# Patient Record
(demographics unavailable — no encounter records)

---

## 2024-11-07 NOTE — HISTORY OF PRESENT ILLNESS
[FreeTextEntry1] : 69 yo female, hx of GERD  on pantoprazole for " years" with llq pain x 4 weeks. No weight loss, anorexia. Denies constipation. Never had colonoscopy. NO dysphagia. Denies CP or sob. NO family hx of colon cancer. Denies Fever or chills.NO dysuria.

## 2024-11-07 NOTE — ASSESSMENT
[FreeTextEntry1] : 69 yo female, hx of GERD  on pantoprazole for " years" with llq pain x 4 weeks. No weight loss, anorexia. Denies constipation. Never had colonoscopy. NO dysphagia. Denies CP or sob. NO family hx of colon cancer. Denies Fever or chills.NO dysuria.  IMP: 1. llq pain x 4 weeks- r/o diverticulitis, abscess 2. COlon cancer screening, average risk 3. Epigastric pain, gerd 4. Exclude Hpylori  PLAN: 1. ubt 2. ct abd/pelvis IV/oral at [facility] 3. She  was advised to undergo endoscopy to which she agreed. The procedure will be performed in [facility] with the assistance of an anesthesiologist. The procedure was explained in detail and she understood the risks of the procedure not limited  to infection, bleeding, perforation, risk of anesthesia and risk of IV site problems,emergency surgery, missed lesions  or non-diagnosis of stomach or esophageal  cancer.He/She]  was advised that she could not drive home alone, if the patient chooses to receive sedation. Further diagnostic and treatment recommendations will be based upon the procedure and any biopsies, if they are taken. 4. She was advised to undergo colonoscopy to which she  agreed. The procedure will be performed in [facility] with the assistance of an anesthesiologist. The procedure was explained in detail and she understood the risks of the procedure not limited  to infection, bleeding, perforation, risk of anesthesia and risk of IV site problems,emergency surgery, missed lesions  or non-diagnosis of colon cancer. She  was advised that she could not drive home alone, if the patient chooses to receive sedation. Further diagnostic and treatment recommendations will be based upon the procedure and any biopsies, if they are taken.

## 2024-11-07 NOTE — PHYSICAL EXAM

## 2025-04-03 NOTE — HISTORY OF PRESENT ILLNESS
[FreeTextEntry1] : Patient presents today with pain, sub. 2, left foot, which she states has been present for the past 3 weeks.  Patient has difficulty ambulating due to pain and discomfort.  She wears heels to [employer] on a daily basis and that may be aggravating her foot.

## 2025-04-03 NOTE — PHYSICAL EXAM
[2+] : left foot dorsalis pedis 2+ [No Joint Swelling] : no joint swelling [Normal Foot/Ankle] : Both lower extremities were exposed and visualized. Standing exam demonstrates normal foot posture and alignment. Hindfoot exam shows no hindfoot valgus or varus [Skin Color & Pigmentation] : normal skin color and pigmentation [Skin Turgor] : normal skin turgor [Skin Lesions] : no skin lesions [Sensation] : the sensory exam was normal to light touch and pinprick [No Focal Deficits] : no focal deficits [Deep Tendon Reflexes (DTR)] : deep tendon reflexes were 2+ and symmetric [Motor Exam] : the motor exam was normal [General Appearance - Alert] : alert [Oriented To Time, Place, And Person] : oriented to person, place, and time [Ankle Swelling (On Exam)] : not present [Varicose Veins Of Lower Extremities] : not present [] : not present [Delayed in the Right Toes] : capillary refills normal in right toes [Delayed in the Left Toes] : capillary refills normal in the left toes [de-identified] : Forefoot varus, compensated.  HAV with bunion.  Contracted digits 2 through 5, bilateral.  Pain, submet. 2.  No pain within the interspace.   [Foot Ulcer] : no foot ulcer [Skin Induration] : no skin induration

## 2025-04-03 NOTE — CONSULT LETTER
[Dear  ___] : Dear  [unfilled], [Courtesy Letter:] : I had the pleasure of seeing your patient, [unfilled], in my office today. [Please see my note below.] : Please see my note below. [Consult Closing:] : Thank you very much for allowing me to participate in the care of this patient.  If you have any questions, please do not hesitate to contact me. [Sincerely,] : Sincerely, [FreeTextEntry2] : Blaine Aleman MD [address] [place], [place] [id number]  [FreeTextEntry3] : Charles M. Lombardi, DPM, FACFAS Systems Chief, Podiatric Services [facility] Assistant Professor of Surgery [facility] [employer] at [facility]

## 2025-04-03 NOTE — HISTORY OF PRESENT ILLNESS
[FreeTextEntry1] : Patient presents today with pain, sub. 2, left foot, which she states has been present for the past 3 weeks.  Patient has difficulty ambulating due to pain and discomfort.  She wears heels to [employer] on a daily basis and that may be aggravating her foot.

## 2025-04-03 NOTE — PHYSICAL EXAM
[2+] : left foot dorsalis pedis 2+ [No Joint Swelling] : no joint swelling [Normal Foot/Ankle] : Both lower extremities were exposed and visualized. Standing exam demonstrates normal foot posture and alignment. Hindfoot exam shows no hindfoot valgus or varus [Skin Color & Pigmentation] : normal skin color and pigmentation [Skin Turgor] : normal skin turgor [Skin Lesions] : no skin lesions [Sensation] : the sensory exam was normal to light touch and pinprick [No Focal Deficits] : no focal deficits [Deep Tendon Reflexes (DTR)] : deep tendon reflexes were 2+ and symmetric [Motor Exam] : the motor exam was normal [General Appearance - Alert] : alert [Oriented To Time, Place, And Person] : oriented to person, place, and time [Ankle Swelling (On Exam)] : not present [Varicose Veins Of Lower Extremities] : not present [] : not present [Delayed in the Right Toes] : capillary refills normal in right toes [Delayed in the Left Toes] : capillary refills normal in the left toes [de-identified] : Forefoot varus, compensated.  HAV with bunion.  Contracted digits 2 through 5, bilateral.  Pain, submet. 2.  No pain within the interspace.   [Foot Ulcer] : no foot ulcer [Skin Induration] : no skin induration

## 2025-04-03 NOTE — ASSESSMENT
[FreeTextEntry1] : Impression: Capsulitis (M77.52), secondary to hammertoe (M20.11) and HAV with bunion (M20.41).  Treatment: Since it has only been present for 3 weeks, I did not feel that an X-ray was warranted at this time but will consider it in the future, if pain persists.  Patient was placed with a metatarsal pad and given instructions on its reapplication.  Will reevaluate in 2 weeks, if pain persists.  Any questions or problems, patient is to contact the office.

## 2025-04-03 NOTE — PHYSICAL EXAM
[2+] : left foot dorsalis pedis 2+ [No Joint Swelling] : no joint swelling [Normal Foot/Ankle] : Both lower extremities were exposed and visualized. Standing exam demonstrates normal foot posture and alignment. Hindfoot exam shows no hindfoot valgus or varus [Skin Color & Pigmentation] : normal skin color and pigmentation [Skin Turgor] : normal skin turgor [Skin Lesions] : no skin lesions [Sensation] : the sensory exam was normal to light touch and pinprick [No Focal Deficits] : no focal deficits [Deep Tendon Reflexes (DTR)] : deep tendon reflexes were 2+ and symmetric [Motor Exam] : the motor exam was normal [General Appearance - Alert] : alert [Oriented To Time, Place, And Person] : oriented to person, place, and time [Ankle Swelling (On Exam)] : not present [Varicose Veins Of Lower Extremities] : not present [] : not present [Delayed in the Right Toes] : capillary refills normal in right toes [Delayed in the Left Toes] : capillary refills normal in the left toes [de-identified] : Forefoot varus, compensated.  HAV with bunion.  Contracted digits 2 through 5, bilateral.  Pain, submet. 2.  No pain within the interspace.   [Foot Ulcer] : no foot ulcer [Skin Induration] : no skin induration

## 2025-04-03 NOTE — CONSULT LETTER
[Dear  ___] : Dear  [unfilled], [Courtesy Letter:] : I had the pleasure of seeing your patient, [unfilled], in my office today. [Please see my note below.] : Please see my note below. [Consult Closing:] : Thank you very much for allowing me to participate in the care of this patient.  If you have any questions, please do not hesitate to contact me. [Sincerely,] : Sincerely, [FreeTextEntry2] : Blaine Aleman MD [address] [place], [place] [id number]  [FreeTextEntry3] : Charles M. Lombardi, DPM, FACFAS Systems Chief, Podiatric Services [facility] Assistant Professor of Surgery [employer] [facility] [employer] at [facility]

## 2025-04-03 NOTE — HISTORY OF PRESENT ILLNESS
[FreeTextEntry1] : Patient presents today with pain, sub. 2, left foot, which she states has been present for the past 3 weeks.  Patient has difficulty ambulating due to pain and discomfort.  She wears heels to [employer] on a daily basis and that may be aggravating her foot.

## 2025-04-03 NOTE — CONSULT LETTER
[Dear  ___] : Dear  [unfilled], [Courtesy Letter:] : I had the pleasure of seeing your patient, [unfilled], in my office today. [Please see my note below.] : Please see my note below. [Consult Closing:] : Thank you very much for allowing me to participate in the care of this patient.  If you have any questions, please do not hesitate to contact me. [Sincerely,] : Sincerely, [FreeTextEntry2] : Blaine Aleman MD [address] [place], [place] [id number]  [FreeTextEntry3] : Charles M. Lombardi, DPM, FACFAS Systems Chief, Podiatric Services [facility] Assistant Professor of Surgery [facility] [employer] [facility] at [facility]